# Patient Record
Sex: MALE | Race: WHITE | Employment: OTHER | URBAN - METROPOLITAN AREA
[De-identification: names, ages, dates, MRNs, and addresses within clinical notes are randomized per-mention and may not be internally consistent; named-entity substitution may affect disease eponyms.]

---

## 2017-12-07 PROBLEM — R53.83 FATIGUE: Status: ACTIVE | Noted: 2017-12-07

## 2017-12-07 PROBLEM — R07.2 PRECORDIAL PAIN: Status: ACTIVE | Noted: 2017-12-07

## 2017-12-07 PROBLEM — R06.02 SOB (SHORTNESS OF BREATH) ON EXERTION: Status: ACTIVE | Noted: 2017-12-07

## 2022-08-25 ENCOUNTER — OFFICE VISIT (OUTPATIENT)
Dept: CARDIOLOGY CLINIC | Age: 72
End: 2022-08-25
Payer: MEDICARE

## 2022-08-25 VITALS
HEART RATE: 68 BPM | DIASTOLIC BLOOD PRESSURE: 70 MMHG | BODY MASS INDEX: 27.53 KG/M2 | SYSTOLIC BLOOD PRESSURE: 126 MMHG | RESPIRATION RATE: 16 BRPM | HEIGHT: 75 IN | WEIGHT: 221.4 LBS

## 2022-08-25 DIAGNOSIS — R53.82 CHRONIC FATIGUE: ICD-10-CM

## 2022-08-25 DIAGNOSIS — R06.02 SOB (SHORTNESS OF BREATH) ON EXERTION: Primary | ICD-10-CM

## 2022-08-25 PROCEDURE — 99204 OFFICE O/P NEW MOD 45 MIN: CPT | Performed by: INTERNAL MEDICINE

## 2022-08-25 PROCEDURE — 93000 ELECTROCARDIOGRAM COMPLETE: CPT | Performed by: INTERNAL MEDICINE

## 2022-08-25 PROCEDURE — 1123F ACP DISCUSS/DSCN MKR DOCD: CPT | Performed by: INTERNAL MEDICINE

## 2022-08-25 RX ORDER — HYDROCORTISONE 25 MG/G
CREAM TOPICAL
COMMUNITY
Start: 2022-07-12

## 2022-08-25 RX ORDER — TAMSULOSIN HYDROCHLORIDE 0.4 MG/1
0.4 CAPSULE ORAL NIGHTLY
COMMUNITY
Start: 2022-07-12

## 2022-08-25 RX ORDER — LIOTHYRONINE SODIUM 5 UG/1
5 TABLET ORAL 2 TIMES DAILY
COMMUNITY

## 2022-08-25 NOTE — PROGRESS NOTES
Patient Active Problem List   Diagnosis    SOB (shortness of breath) on exertion    Fatigue    Precordial pain       Current Outpatient Medications   Medication Sig Dispense Refill    tamsulosin (FLOMAX) 0.4 MG capsule Take 0.4 mg by mouth nightly      liothyronine (CYTOMEL) 5 MCG tablet Take 5 mcg by mouth 2 times daily      hydrocortisone (ANUSOL-HC) 2.5 % CREA rectal cream apply topically three times a day for 14 days      DHEA 10 MG CAPS Take by mouth      lisinopril (PRINIVIL;ZESTRIL) 5 MG tablet Take 5 mg by mouth daily      ARMOUR THYROID 60 MG tablet   0    RABEprazole (ACIPHEX) 20 MG tablet   0    cyanocobalamin 1000 MCG/ML injection   0    Ergocalciferol (VITAMIN D2 PO) Take by mouth      Coenzyme Q10 (COQ10) 100 MG CAPS Take by mouth      Multiple Vitamins-Minerals (THERAPEUTIC MULTIVITAMIN-MINERALS) tablet Take 1 tablet by mouth daily       No current facility-administered medications for this visit. CC:    Patient is seen in Initial Evaluation for:  1. SOB (shortness of breath) on exertion    2. Chronic fatigue        HPI:  Patient is seen in evaluation for difficulties with fatigue and shortness of breath with exertion. Notes that he felt unusually stressed by lifting a grandchild. Required several minutes to recover. Also notes increased tiredness with exertion. No chest pain. Denies any heart racing or fluttering.     ROS:   General: No unusual weight gain, change in exercise tolerance  Skin: No rash or itching  EENT: No vision changes or nosebleeds  Cardiovascular: No orthopnea or paroxysmal nocturnal dyspnea  Respiratory: No cough or hemoptysis  Gastrointestinal: No hematemesis or recent changes in bowel habits  Genitourinary: No hematuria, urgency or frequency  Musculoskeletal: No muscular weakness or joint swelling   Neurologic / Psychiatric: No incoordination or convulsions  Allergic / Immunologic/ Lymphatic / Endocrine: No anemia or bleeding tendency    Social History Socioeconomic History    Marital status:      Spouse name: Not on file    Number of children: Not on file    Years of education: Not on file    Highest education level: Not on file   Occupational History    Not on file   Tobacco Use    Smoking status: Never    Smokeless tobacco: Never   Vaping Use    Vaping Use: Never used   Substance and Sexual Activity    Alcohol use: No    Drug use: No    Sexual activity: Not on file   Other Topics Concern    Not on file   Social History Narrative    Not on file     Social Determinants of Health     Financial Resource Strain: Not on file   Food Insecurity: Not on file   Transportation Needs: Not on file   Physical Activity: Not on file   Stress: Not on file   Social Connections: Not on file   Intimate Partner Violence: Not on file   Housing Stability: Not on file       No family history on file. Past Medical History:   Diagnosis Date    Prostate disorder     Thyroid activity decreased     Ulcers of both great toes (HCC)        PHYSICAL EXAM:  CONSTITUTIONAL:  Well developed, well nourished    Vitals:    08/25/22 0924   BP: 126/70   Pulse: 68   Resp: 16   Weight: 221 lb 6.4 oz (100.4 kg)   Height: 6' 3\" (1.905 m)     HEAD & FACE: Normocephalic. Symmetric. EYES: No xanthelasma. Conjunctivae not injected. EARS, NOSE, MOUTH & THROAT: Good dentition. No oral pallor or cyanosis. NECK: No JVD at 30 degrees. No thyromegaly. RESPIRATORY: Clear to auscultation and percussion in all fields. No use of accessory muscle or intercostal retractions. CARDIOVASCULAR: Regular rate and rhythm. No lifts or thrills on palpitation. Auscultation with normal S1-S2 in intensity and splitting. No murmurs with Valsalva. No carotid bruits. Abdominal aorta not enlarged. Femoral arteries without bruits. Pedal pulses 2+. No edema. ABDOMEN: Soft without hepatic or splenic enlargement. No tenderness. MUSCULOSKELETAL: No kyphosis or scoliosis of the back.   Good muscle strength and tone. No muscle atrophy. Normal gait and ability to undergo exercise stress testing. EXTREMITIES: No clubbing or cyanosis. SKIN: No Xanthomas or ulcerations. NEUROLOGIC: Oriented to time, place and person. Normal mood and affect. LYMPHATIC:  No palpable neck or supraclavicular nodes. No splenomegaly. EKG: the EKG tracing was reviewed and found to reveal: Normal sinus rhythm.  ms. No change compared to prior tracing. ASSESSMENT:                                                     ORDERS:       Diagnosis Orders   1. SOB (shortness of breath) on exertion  EKG 12 lead    Cardiac Stress Test Exercise - Treadmill      2. Chronic fatigue        Possible anginal equivalent      PLAN:   See above orders. Medication reconciliation completed. Old records were reviewed and found to reveal:  on 7/22/2022 TSH within normal limits  Discussed issues that would prompt earlier evaluation. Same cardiac medications. Follow-up office visit >>>> pending above.   May require echo/BNP

## 2022-09-12 ENCOUNTER — TELEPHONE (OUTPATIENT)
Dept: CARDIOLOGY | Age: 72
End: 2022-09-12

## 2022-09-12 NOTE — TELEPHONE ENCOUNTER
Left message on voice mail to remind patient of regular stress test appointment on September 14, 2022 at [facility]. Instructions for test,, and COVID-19 preprocedure information left on voice mail. Asked patient to call with any questions or if unable to keep appointment. Tried primary phone number first but there was no answer or voice mail.

## 2022-09-14 ENCOUNTER — HOSPITAL ENCOUNTER (OUTPATIENT)
Dept: CARDIOLOGY | Age: 72
Discharge: HOME | End: 2022-09-14
Payer: MEDICARE

## 2022-09-14 VITALS
HEIGHT: 75 IN | DIASTOLIC BLOOD PRESSURE: 70 MMHG | HEART RATE: 64 BPM | WEIGHT: 221 LBS | SYSTOLIC BLOOD PRESSURE: 116 MMHG | BODY MASS INDEX: 27.48 KG/M2 | RESPIRATION RATE: 16 BRPM

## 2022-09-14 PROCEDURE — 93017 CV STRESS TEST TRACING ONLY: CPT

## 2022-09-14 RX ORDER — LORATADINE 10 MG/1
TABLET ORAL
COMMUNITY

## 2022-09-14 RX ORDER — ERGOCALCIFEROL 1.25 MG/1
CAPSULE ORAL
COMMUNITY
Start: 2022-08-26

## 2022-09-14 NOTE — PROCEDURES
[id number] [facility] - [address]  [phone]    Exercise Stress Study (non-nuclear)      Name: [facility] Account Number:  [de-identified]      :  1950          Sex: male         Date of Study:  2022    Height: 6' 3\" (190.5 cm)          Weight: 221 lb (100.2 kg)    Ordering Provider: Diego Galvez MD          PCP: Oliver Young MD    Cardiologist: Diego Galvez MD              Interpreting Physician: Edwin Shah MD    Indication:   Detecting the presence and location of coronary artery disease    Clinical History:   Patient has no known history of coronary artery disease. Resting ECG:    Normal sinus rhythm with occasional premature ventricular complex, otherwise normal EKG. Exercise: The patient exercised using a Scott protocol, completing 8:00 minutes and reaching an estimated work load of 14.8 metabolic equivalents (METS). Resting HR was 61. Peak exercise heart rate was 140 ( 93% of maximum predicted heart rate for age). Baseline /70. Peak exercise /80. The blood pressure response to exercise was normal      Exercise was terminated due to dyspnea and bilateral leg/calf \"burning\" . The patient experienced no chest pain with exercise. Pulse oximetry was used to monitor oxygen saturation during the stress test.  The study was performed on [employer]. The resting pulse oximeter was 97%. The lowest O2 saturation seen during exercise was 95 %. The average O2 saturation with exercise was 96 %. Exercise ECG:   The patient demonstrated  rare PVC  during exercise. With exercise, there were no ST segment changes of significance at the heart rate achieved. Cavazos treadmill score was 8 implying low risk. Impression:    Exercise EKG was negative. The patient experienced no chest pain with exercise. Cavazos treadmill score was 8 implying low risk.     Exercise capacity was average. There was an appropriate heart rate and blood pressure response to exercise and recovery. Low risk general exercise treadmill test.    Thank you for sending your patient to this [facility].     Electronically signed by Lewis Drew MD on 9/14/22 at 3:34 PM EDT

## 2022-09-15 ENCOUNTER — TELEPHONE (OUTPATIENT)
Dept: CARDIOLOGY CLINIC | Age: 72
End: 2022-09-15

## 2022-09-15 NOTE — TELEPHONE ENCOUNTER
----- Message from Bozena Greoc MD sent at 9/14/2022  4:54 PM EDT -----  Please let patient know that stress test was normal.  Recommend echocardiogram be performed to complete his evaluation.

## 2022-09-27 DIAGNOSIS — R07.2 PRECORDIAL PAIN: ICD-10-CM

## 2022-09-27 DIAGNOSIS — R06.02 SOB (SHORTNESS OF BREATH): Primary | ICD-10-CM

## 2022-09-27 NOTE — TELEPHONE ENCOUNTER
Patients wife notified and instructed on stress test results and recommendations.   Echo ordered today

## 2022-10-04 ENCOUNTER — HOSPITAL ENCOUNTER (OUTPATIENT)
Dept: CARDIOLOGY | Age: 72
Discharge: HOME | End: 2022-10-04
Payer: MEDICARE

## 2022-10-04 DIAGNOSIS — R07.2 PRECORDIAL PAIN: ICD-10-CM

## 2022-10-04 DIAGNOSIS — R06.02 SOB (SHORTNESS OF BREATH): ICD-10-CM

## 2022-10-04 LAB
LV EF: 60 %
LVEF MODALITY: NORMAL

## 2022-10-04 PROCEDURE — 93306 TTE W/DOPPLER COMPLETE: CPT | Performed by: PSYCHIATRY & NEUROLOGY

## 2022-10-10 ENCOUNTER — TELEPHONE (OUTPATIENT)
Dept: CARDIOLOGY CLINIC | Age: 72
End: 2022-10-10

## 2022-10-10 NOTE — TELEPHONE ENCOUNTER
----- Message from Clemente Torrez MD sent at 10/7/2022  2:38 PM EDT -----  These let patient know that echocardiogram looked normal.  Cardiac screening test did not suggest any abnormalities.